# Patient Record
Sex: MALE | Race: BLACK OR AFRICAN AMERICAN | NOT HISPANIC OR LATINO
[De-identification: names, ages, dates, MRNs, and addresses within clinical notes are randomized per-mention and may not be internally consistent; named-entity substitution may affect disease eponyms.]

---

## 2022-07-25 PROBLEM — Z00.00 ENCOUNTER FOR PREVENTIVE HEALTH EXAMINATION: Status: ACTIVE | Noted: 2022-07-25

## 2022-07-26 ENCOUNTER — TRANSCRIPTION ENCOUNTER (OUTPATIENT)
Age: 32
End: 2022-07-26

## 2022-07-26 ENCOUNTER — APPOINTMENT (OUTPATIENT)
Dept: PEDIATRIC ORTHOPEDIC SURGERY | Facility: CLINIC | Age: 32
End: 2022-07-26

## 2022-07-26 VITALS — HEIGHT: 73 IN | WEIGHT: 208 LBS | BODY MASS INDEX: 27.57 KG/M2

## 2022-07-26 VITALS — WEIGHT: 190 LBS | BODY MASS INDEX: 33.66 KG/M2 | HEIGHT: 63 IN

## 2022-07-26 DIAGNOSIS — Z82.49 FAMILY HISTORY OF ISCHEMIC HEART DISEASE AND OTHER DISEASES OF THE CIRCULATORY SYSTEM: ICD-10-CM

## 2022-07-26 DIAGNOSIS — M23.8X2 OTHER INTERNAL DERANGEMENTS OF LEFT KNEE: ICD-10-CM

## 2022-07-26 DIAGNOSIS — F19.90 OTHER PSYCHOACTIVE SUBSTANCE USE, UNSPECIFIED, UNCOMPLICATED: ICD-10-CM

## 2022-07-26 DIAGNOSIS — Z78.9 OTHER SPECIFIED HEALTH STATUS: ICD-10-CM

## 2022-07-26 DIAGNOSIS — F17.200 NICOTINE DEPENDENCE, UNSPECIFIED, UNCOMPLICATED: ICD-10-CM

## 2022-07-26 PROCEDURE — 99202 OFFICE O/P NEW SF 15 MIN: CPT

## 2022-07-26 PROCEDURE — 73562 X-RAY EXAM OF KNEE 3: CPT

## 2022-07-26 RX ORDER — MELOXICAM 15 MG/1
15 TABLET ORAL
Qty: 30 | Refills: 1 | Status: ACTIVE | COMMUNITY
Start: 2022-07-26 | End: 1900-01-01

## 2022-07-26 NOTE — PHYSICAL EXAM
[de-identified] : Exam today reveals a mild antalgic component to his gait on the left side.  He has good motion to the left hip the left knee he has full extension and flexes to 100 degrees flexion passively beyond this is met with spasm and pain.  There is no obvious instability on stress of the cruciate/collateral ligaments.  He has no significant pain on patellofemoral grind.  He does have pain in the medial compartment and posterior medial joint line with a mildly positive Steinmann maneuver.  He is not tender laterally.  The popliteal fossa calf neurovascular exam are negative.\par \par X-rays ordered and taken today of the left knee 3 views AP notch view and lateral reveal

## 2022-07-26 NOTE — HISTORY OF PRESENT ILLNESS
[de-identified] : This 32-year-old healthy gentleman is seen today for evaluation of his left knee.  This patient states 1 year ago he was playing basketball sustained a hyperextension injury to his knee causing swelling and limp.  He he did improve within a short period of time and subsequent to this he on occasion would have some discomfort though nothing that would cause him to seek attention.  He continued to do well up until 2 weeks ago when he noted recurrent pain with acute swelling and restricted motion.  This was not preceded by an obvious traumatic event.  Currently he has noted improvement with the swelling though still has pain with flexion.  He has no obvious locking buckling or sensation of instability.  No other significant joint complaints his past history is unremarkable

## 2022-07-26 NOTE — CONSULT LETTER
[Dear  ___] : Dear  [unfilled], [Consult Letter:] : I had the pleasure of evaluating your patient, [unfilled]. [Please see my note below.] : Please see my note below. [Consult Closing:] : Thank you very much for allowing me to participate in the care of this patient.  If you have any questions, please do not hesitate to contact me. [Sincerely,] : Sincerely, [[email or web address]] : Dr Arslan Naidu JR.\par